# Patient Record
Sex: MALE | Race: OTHER | URBAN - METROPOLITAN AREA
[De-identification: names, ages, dates, MRNs, and addresses within clinical notes are randomized per-mention and may not be internally consistent; named-entity substitution may affect disease eponyms.]

---

## 2022-07-12 ENCOUNTER — TELEPHONE (OUTPATIENT)
Dept: ORTHOPEDIC SURGERY | Age: 37
End: 2022-07-12

## 2022-07-12 NOTE — TELEPHONE ENCOUNTER
PATIENTS INJECTION WAS DENIED BY THE INS [employer] BECAUSE THEY DID NOT RECEIVE ALL OF THE OFFICE NOTE THAT WAS SCANNED AND UPLOADED THROUGH THE PROVIDER PORTAL. PATIENT HAS DONE EVERYTHING THAT IS REQUIRED TO BE APPROVED FOR THE BLOCK.  WE WILL BE FILING AN APPEAL